# Patient Record
Sex: MALE | Race: BLACK OR AFRICAN AMERICAN | NOT HISPANIC OR LATINO | Employment: OTHER | URBAN - METROPOLITAN AREA
[De-identification: names, ages, dates, MRNs, and addresses within clinical notes are randomized per-mention and may not be internally consistent; named-entity substitution may affect disease eponyms.]

---

## 2017-09-01 ENCOUNTER — HOSPITAL ENCOUNTER (INPATIENT)
Facility: HOSPITAL | Age: 67
LOS: 4 days | Discharge: HOME | DRG: 291 | End: 2017-09-05
Attending: INTERNAL MEDICINE | Admitting: INTERNAL MEDICINE
Payer: MEDICARE

## 2017-09-01 DIAGNOSIS — J18.9 PNEUMONIA DUE TO INFECTIOUS ORGANISM, UNSPECIFIED LATERALITY, UNSPECIFIED PART OF LUNG: ICD-10-CM

## 2017-09-01 DIAGNOSIS — R07.2 PRECORDIAL PAIN: ICD-10-CM

## 2017-09-01 DIAGNOSIS — I21.A1 NON-ST ELEVATION MYOCARDIAL INFARCTION (NSTEMI), TYPE 2: ICD-10-CM

## 2017-09-01 DIAGNOSIS — I50.43 ACUTE ON CHRONIC COMBINED SYSTOLIC AND DIASTOLIC CONGESTIVE HEART FAILURE: ICD-10-CM

## 2017-09-01 DIAGNOSIS — I50.9 CHF (CONGESTIVE HEART FAILURE): Primary | ICD-10-CM

## 2017-09-01 PROCEDURE — 93306 TTE W/DOPPLER COMPLETE: CPT | Mod: 26,,, | Performed by: INTERNAL MEDICINE

## 2017-09-01 PROCEDURE — 93010 ELECTROCARDIOGRAM REPORT: CPT | Mod: ,,, | Performed by: INTERNAL MEDICINE

## 2017-09-01 PROCEDURE — C8929 TTE W OR WO FOL WCON,DOPPLER: HCPCS

## 2017-09-01 PROCEDURE — A4216 STERILE WATER/SALINE, 10 ML: HCPCS | Performed by: INTERNAL MEDICINE

## 2017-09-01 PROCEDURE — 11000001 HC ACUTE MED/SURG PRIVATE ROOM

## 2017-09-01 PROCEDURE — 93005 ELECTROCARDIOGRAM TRACING: CPT

## 2017-09-01 PROCEDURE — 25000003 PHARM REV CODE 250: Performed by: INTERNAL MEDICINE

## 2017-09-01 RX ORDER — LISINOPRIL 20 MG/1
40 TABLET ORAL DAILY
Status: DISCONTINUED | OUTPATIENT
Start: 2017-09-02 | End: 2017-09-05 | Stop reason: HOSPADM

## 2017-09-01 RX ORDER — POTASSIUM CHLORIDE 20 MEQ/1
20 TABLET, EXTENDED RELEASE ORAL 2 TIMES DAILY
Status: DISCONTINUED | OUTPATIENT
Start: 2017-09-01 | End: 2017-09-05 | Stop reason: HOSPADM

## 2017-09-01 RX ORDER — CARVEDILOL 25 MG/1
25 TABLET ORAL 2 TIMES DAILY WITH MEALS
Status: DISCONTINUED | OUTPATIENT
Start: 2017-09-01 | End: 2017-09-05 | Stop reason: HOSPADM

## 2017-09-01 RX ORDER — FUROSEMIDE 10 MG/ML
40 INJECTION INTRAMUSCULAR; INTRAVENOUS 2 TIMES DAILY
Status: DISCONTINUED | OUTPATIENT
Start: 2017-09-02 | End: 2017-09-03

## 2017-09-01 RX ORDER — ALBUTEROL SULFATE 90 UG/1
1 AEROSOL, METERED RESPIRATORY (INHALATION) EVERY 4 HOURS PRN
Status: DISCONTINUED | OUTPATIENT
Start: 2017-09-01 | End: 2017-09-05 | Stop reason: HOSPADM

## 2017-09-01 RX ORDER — ATORVASTATIN CALCIUM 40 MG/1
80 TABLET, FILM COATED ORAL DAILY
Status: DISCONTINUED | OUTPATIENT
Start: 2017-09-02 | End: 2017-09-05 | Stop reason: HOSPADM

## 2017-09-01 RX ORDER — BENZONATATE 100 MG/1
100 CAPSULE ORAL EVERY 4 HOURS PRN
Status: DISCONTINUED | OUTPATIENT
Start: 2017-09-01 | End: 2017-09-05 | Stop reason: HOSPADM

## 2017-09-01 RX ORDER — ASPIRIN 81 MG/1
81 TABLET ORAL DAILY
Status: DISCONTINUED | OUTPATIENT
Start: 2017-09-02 | End: 2017-09-05 | Stop reason: HOSPADM

## 2017-09-01 RX ORDER — ONDANSETRON 8 MG/1
8 TABLET, ORALLY DISINTEGRATING ORAL EVERY 8 HOURS PRN
Status: DISCONTINUED | OUTPATIENT
Start: 2017-09-01 | End: 2017-09-05 | Stop reason: HOSPADM

## 2017-09-01 RX ORDER — SODIUM CHLORIDE 0.9 % (FLUSH) 0.9 %
3 SYRINGE (ML) INJECTION EVERY 8 HOURS
Status: DISCONTINUED | OUTPATIENT
Start: 2017-09-01 | End: 2017-09-05 | Stop reason: HOSPADM

## 2017-09-01 RX ORDER — FUROSEMIDE 40 MG/1
40 TABLET ORAL 2 TIMES DAILY
Status: DISCONTINUED | OUTPATIENT
Start: 2017-09-01 | End: 2017-09-02

## 2017-09-01 RX ORDER — MIRTAZAPINE 15 MG/1
15 TABLET, FILM COATED ORAL NIGHTLY
Status: DISCONTINUED | OUTPATIENT
Start: 2017-09-01 | End: 2017-09-05 | Stop reason: HOSPADM

## 2017-09-01 RX ORDER — FUROSEMIDE 10 MG/ML
40 INJECTION INTRAMUSCULAR; INTRAVENOUS 2 TIMES DAILY
Status: DISCONTINUED | OUTPATIENT
Start: 2017-09-02 | End: 2017-09-01

## 2017-09-01 RX ADMIN — BENZONATATE 100 MG: 100 CAPSULE ORAL at 09:09

## 2017-09-01 RX ADMIN — MIRTAZAPINE 15 MG: 15 TABLET, FILM COATED ORAL at 09:09

## 2017-09-01 RX ADMIN — POTASSIUM CHLORIDE 20 MEQ: 20 TABLET, EXTENDED RELEASE ORAL at 09:09

## 2017-09-01 RX ADMIN — SODIUM CHLORIDE, PRESERVATIVE FREE 3 ML: 5 INJECTION INTRAVENOUS at 09:09

## 2017-09-01 RX ADMIN — FUROSEMIDE 40 MG: 40 TABLET ORAL at 09:09

## 2017-09-01 RX ADMIN — CARVEDILOL 25 MG: 25 TABLET, FILM COATED ORAL at 09:09

## 2017-09-02 LAB
ALBUMIN SERPL BCP-MCNC: 3.8 G/DL
ALP SERPL-CCNC: 38 U/L
ALT SERPL W/O P-5'-P-CCNC: 18 U/L
ANION GAP SERPL CALC-SCNC: 8 MMOL/L
ANION GAP SERPL CALC-SCNC: 9 MMOL/L
AST SERPL-CCNC: 37 U/L
BASOPHILS # BLD AUTO: 0.05 K/UL
BASOPHILS NFR BLD: 1 %
BILIRUB SERPL-MCNC: 0.6 MG/DL
BUN SERPL-MCNC: 23 MG/DL
BUN SERPL-MCNC: 24 MG/DL
CALCIUM SERPL-MCNC: 9.3 MG/DL
CALCIUM SERPL-MCNC: 9.6 MG/DL
CHLORIDE SERPL-SCNC: 101 MMOL/L
CHLORIDE SERPL-SCNC: 103 MMOL/L
CO2 SERPL-SCNC: 28 MMOL/L
CO2 SERPL-SCNC: 32 MMOL/L
CREAT SERPL-MCNC: 1.2 MG/DL
CREAT SERPL-MCNC: 1.2 MG/DL
DIFFERENTIAL METHOD: ABNORMAL
EOSINOPHIL # BLD AUTO: 0.4 K/UL
EOSINOPHIL NFR BLD: 7.9 %
ERYTHROCYTE [DISTWIDTH] IN BLOOD BY AUTOMATED COUNT: 13 %
EST. GFR  (AFRICAN AMERICAN): >60 ML/MIN/1.73 M^2
EST. GFR  (AFRICAN AMERICAN): >60 ML/MIN/1.73 M^2
EST. GFR  (NON AFRICAN AMERICAN): >60 ML/MIN/1.73 M^2
EST. GFR  (NON AFRICAN AMERICAN): >60 ML/MIN/1.73 M^2
GLUCOSE SERPL-MCNC: 97 MG/DL
GLUCOSE SERPL-MCNC: 98 MG/DL
HCT VFR BLD AUTO: 41.7 %
HGB BLD-MCNC: 13.6 G/DL
LYMPHOCYTES # BLD AUTO: 2 K/UL
LYMPHOCYTES NFR BLD: 41.1 %
MAGNESIUM SERPL-MCNC: 1.9 MG/DL
MCH RBC QN AUTO: 29.4 PG
MCHC RBC AUTO-ENTMCNC: 32.6 G/DL
MCV RBC AUTO: 90 FL
MONOCYTES # BLD AUTO: 0.6 K/UL
MONOCYTES NFR BLD: 13.4 %
NEUTROPHILS # BLD AUTO: 1.7 K/UL
NEUTROPHILS NFR BLD: 36.4 %
PHOSPHATE SERPL-MCNC: 5.2 MG/DL
PLATELET # BLD AUTO: 123 K/UL
PMV BLD AUTO: 10.5 FL
POTASSIUM SERPL-SCNC: 4.4 MMOL/L
POTASSIUM SERPL-SCNC: 5.3 MMOL/L
PROT SERPL-MCNC: 7.6 G/DL
RBC # BLD AUTO: 4.62 M/UL
SODIUM SERPL-SCNC: 139 MMOL/L
SODIUM SERPL-SCNC: 142 MMOL/L
TROPONIN I SERPL DL<=0.01 NG/ML-MCNC: 0.06 NG/ML
WBC # BLD AUTO: 4.79 K/UL

## 2017-09-02 PROCEDURE — 85025 COMPLETE CBC W/AUTO DIFF WBC: CPT

## 2017-09-02 PROCEDURE — 94761 N-INVAS EAR/PLS OXIMETRY MLT: CPT

## 2017-09-02 PROCEDURE — 80048 BASIC METABOLIC PNL TOTAL CA: CPT

## 2017-09-02 PROCEDURE — 84100 ASSAY OF PHOSPHORUS: CPT

## 2017-09-02 PROCEDURE — 27000221 HC OXYGEN, UP TO 24 HOURS

## 2017-09-02 PROCEDURE — 25000242 PHARM REV CODE 250 ALT 637 W/ HCPCS: Performed by: INTERNAL MEDICINE

## 2017-09-02 PROCEDURE — 84484 ASSAY OF TROPONIN QUANT: CPT

## 2017-09-02 PROCEDURE — 36415 COLL VENOUS BLD VENIPUNCTURE: CPT

## 2017-09-02 PROCEDURE — 80053 COMPREHEN METABOLIC PANEL: CPT

## 2017-09-02 PROCEDURE — 63600175 PHARM REV CODE 636 W HCPCS: Performed by: INTERNAL MEDICINE

## 2017-09-02 PROCEDURE — 83735 ASSAY OF MAGNESIUM: CPT

## 2017-09-02 PROCEDURE — 25000003 PHARM REV CODE 250: Performed by: INTERNAL MEDICINE

## 2017-09-02 PROCEDURE — 11000001 HC ACUTE MED/SURG PRIVATE ROOM

## 2017-09-02 PROCEDURE — 99223 1ST HOSP IP/OBS HIGH 75: CPT | Mod: ,,, | Performed by: INTERNAL MEDICINE

## 2017-09-02 PROCEDURE — A4216 STERILE WATER/SALINE, 10 ML: HCPCS | Performed by: INTERNAL MEDICINE

## 2017-09-02 PROCEDURE — 27100098 HC SPACER

## 2017-09-02 PROCEDURE — 94640 AIRWAY INHALATION TREATMENT: CPT

## 2017-09-02 RX ORDER — ACETAMINOPHEN 325 MG/1
650 TABLET ORAL EVERY 6 HOURS PRN
Status: DISCONTINUED | OUTPATIENT
Start: 2017-09-02 | End: 2017-09-05 | Stop reason: HOSPADM

## 2017-09-02 RX ADMIN — ATORVASTATIN CALCIUM 80 MG: 40 TABLET, FILM COATED ORAL at 08:09

## 2017-09-02 RX ADMIN — FUROSEMIDE 40 MG: 40 TABLET ORAL at 08:09

## 2017-09-02 RX ADMIN — POTASSIUM CHLORIDE 20 MEQ: 20 TABLET, EXTENDED RELEASE ORAL at 09:09

## 2017-09-02 RX ADMIN — SODIUM CHLORIDE, PRESERVATIVE FREE 3 ML: 5 INJECTION INTRAVENOUS at 05:09

## 2017-09-02 RX ADMIN — SODIUM CHLORIDE, PRESERVATIVE FREE 3 ML: 5 INJECTION INTRAVENOUS at 01:09

## 2017-09-02 RX ADMIN — POTASSIUM CHLORIDE 20 MEQ: 20 TABLET, EXTENDED RELEASE ORAL at 08:09

## 2017-09-02 RX ADMIN — MIRTAZAPINE 15 MG: 15 TABLET, FILM COATED ORAL at 09:09

## 2017-09-02 RX ADMIN — LISINOPRIL 40 MG: 20 TABLET ORAL at 08:09

## 2017-09-02 RX ADMIN — CARVEDILOL 25 MG: 25 TABLET, FILM COATED ORAL at 05:09

## 2017-09-02 RX ADMIN — CARVEDILOL 25 MG: 25 TABLET, FILM COATED ORAL at 08:09

## 2017-09-02 RX ADMIN — ASPIRIN 81 MG: 81 TABLET, COATED ORAL at 08:09

## 2017-09-02 RX ADMIN — SODIUM CHLORIDE, PRESERVATIVE FREE 3 ML: 5 INJECTION INTRAVENOUS at 09:09

## 2017-09-02 RX ADMIN — ALBUTEROL SULFATE 1 PUFF: 90 AEROSOL, METERED RESPIRATORY (INHALATION) at 07:09

## 2017-09-02 RX ADMIN — FUROSEMIDE 40 MG: 10 INJECTION, SOLUTION INTRAMUSCULAR; INTRAVENOUS at 05:09

## 2017-09-02 RX ADMIN — FUROSEMIDE 40 MG: 10 INJECTION, SOLUTION INTRAMUSCULAR; INTRAVENOUS at 08:09

## 2017-09-02 RX ADMIN — BENZONATATE 100 MG: 100 CAPSULE ORAL at 09:09

## 2017-09-02 NOTE — PLAN OF CARE
Problem: Patient Care Overview  Goal: Plan of Care Review  Outcome: Ongoing (interventions implemented as appropriate)  Plan of care reviewed with patient. Call light within reach, fall precautions maintained, bed in lowest position, wheels locked, bed alarm set, side rails up x's 2. Patient aware. Nurse instructed patient to call if needs assistance. Patient verbalized complete understanding. Telemetry monitor paced throughout shift. Pt remains on 2L NC. Pt denies any pain or discomfort. Will continue to monitor and continue plan of care.

## 2017-09-02 NOTE — H&P
[facility]               H&P      Reason for Admission:        Assessment:    1. Acute decompensated CHF  2. NSTEMI  3. Pacemaker  4. HTN  5. Chest Pain likely Muscle skeletal from coughing      Plan:    Will diurese with lasix 40 mg IV bid  Strict I/Os  2 d echo complete  Trend troponin. If continues to increase will consider [facility]  Pacemaker interrogation        Tim Live MD  Cardiology Service      HPI: 67 y/o AA male with PMH of HTN, CAD s/p 2 stents, Pacemaker, CHF present with progressively worsening dyspnea. Dyspnea is associated with coughing and chest pain but chest pain is intermittent and only occur with coughing. He was able to walk 1 block when moved here from [place] 2 weeks ago but is now unable to walk 25 feet. Coughing is non productive. Patient admit to eating salty food since moving. [facility] He denies non compliance with medications. Troponin is mildly elevated and CXR showed CHF.           Review of Systems -Except for what was mentioned above in HPI  Constitution: Negative.   HENT: Negative.   Eyes: Negative.   Cardiovascular: Negative.   Respiratory: Negative.   Endocrine: Negative.   Hematologic/Lymphatic: Negative.   Skin: Negative.   Musculoskeletal: Negative.   Gastrointestinal: Negative.           Past Surgical History:   Procedure Laterality Date    JOINT REPLACEMENT      R hip and R knee       Past Medical History:   Diagnosis Date    CHF (congestive heart failure)     Coronary artery disease     HLD (hyperlipidemia)     Insomnia     Mood disorder     PTSD (post-traumatic stress disorder)         reports that he has never smoked. He has never used smokeless tobacco. He reports that he does not drink alcohol or use drugs.     No family history on file.       Review of patient's allergies indicates:   Allergen Reactions    Pcn [penicillins]        Current Discharge Medication List      CONTINUE these medications which have NOT CHANGED     Details   albuterol sulfate 90 mcg/actuation AePB Inhale 90 mcg into the lungs every 6 (six) hours as needed. Rescue      aspirin (ECOTRIN) 81 MG EC tablet Take 81 mg by mouth once daily.      atorvastatin (LIPITOR) 80 MG tablet Take 80 mg by mouth once daily.      carvedilol (COREG) 25 MG tablet Take 25 mg by mouth 2 (two) times daily with meals.      furosemide (LASIX) 40 MG tablet Take 40 mg by mouth 2 (two) times daily.      lisinopril (PRINIVIL,ZESTRIL) 40 MG tablet Take 40 mg by mouth once daily.      magnesium oxide (MAG-OX) 250 mg Tab Take by mouth once.      mirtazapine (REMERON) 15 MG tablet Take 15 mg by mouth every evening.      nitroGLYCERIN (NITROSTAT) 0.4 MG SL tablet Place 0.4 mg under the tongue every 5 (five) minutes as needed for Chest pain.      potassium chloride SA (K-DUR,KLOR-CON) 20 MEQ tablet Take 20 mEq by mouth 2 (two) times daily.              aspirin  81 mg Oral Daily    atorvastatin  80 mg Oral Daily    carvedilol  25 mg Oral BID WM    furosemide  40 mg Intravenous BID    lisinopril  40 mg Oral Daily    mirtazapine  15 mg Oral QHS    potassium chloride SA  20 mEq Oral BID    sodium chloride 0.9%  3 mL Intravenous Q8H            albuterol, benzonatate, ondansetron    Vitals:    09/02/17 0431 09/02/17 0500 09/02/17 0745 09/02/17 0818   BP: 125/85   (!) 180/90   BP Location: Left arm   Right arm   Patient Position: Lying   Lying   Pulse: 86 62 76 77   Resp: 20  20 20   Temp: (!) 101.1 °F (38.4 °C)   99.3 °F (37.4 °C)   TempSrc: Oral   Oral   SpO2:   96%    Weight:       Height:             Physical Examination:  General Appearance: No acute distress  Mental: Alert to person, time and place  HEENT: Perrl  Chest: No pain with palpitations, no chest deformities  Heart: RRR, no murmurs, no gallops or rubs  Lung: Rales bilaterally   ABDOMEN: Soft, nontender, nondistended with good bowel sounds heard. No mass or hepatosplenomegaly  EXTREMITIES: Without cyanosis, clubbing or edema.    NEUROLOGICAL: Gross nonfocal. Skin: Warm and dry without any rash. There is no costovertebral angle tenderness.   Skin: no rashes lesions or ulcers    Lab Results   Component Value Date     09/02/2017    K 5.3 (H) 09/02/2017     09/02/2017    CO2 32 (H) 09/02/2017    BUN 23 09/02/2017    CREATININE 1.2 09/02/2017    GLU 97 09/02/2017    MG 1.9 09/02/2017    AST 37 09/02/2017    ALT 18 09/02/2017    ALBUMIN 3.8 09/02/2017    PROT 7.6 09/02/2017    BILITOT 0.6 09/02/2017    WBC 4.79 09/02/2017    HGB 13.6 (L) 09/02/2017    HCT 41.7 09/02/2017    MCV 90 09/02/2017     (L) 09/02/2017    INR 1.2 09/01/2017         Recent Labs  Lab 09/01/17  1258   TROPONINI 0.072*         Recent Labs  Lab 09/01/17  1258   TROPONINI 0.072*         No results found for: TSH    No results found for: HGBA1C            Images and labs reviewed    ECG: Ventricular paced rhythm with secondary ST changes

## 2017-09-02 NOTE — PLAN OF CARE
Plan of care reviewed with patient, understanding verbalized. No complaints of chest pain overnight except for soreness when coughing. PRN meds given for wheezing and cough. Pt resting comfortably, no acute distress noted. Bed alarm on, call light in reach, fall precautions in place. Will continue to monitor and report to oncoming nurse.

## 2017-09-02 NOTE — PLAN OF CARE
Problem: Patient Care Overview  Goal: Plan of Care Review  Outcome: Ongoing (interventions implemented as appropriate)  Pt received on NC at 2 LPM. Pt SpO2 95 %. No respiratory distress. Will continue to monitor.

## 2017-09-02 NOTE — PLAN OF CARE
09/02/17 1120   Discharge Assessment   Assessment Type Discharge Planning Assessment   Confirmed/corrected address and phone number on facesheet? Yes   Assessment information obtained from? Patient   Prior to hospitilization cognitive status: Alert/Oriented   Prior to hospitalization functional status: Independent;Assistive Equipment   Current cognitive status: Alert/Oriented   Current Functional Status: Independent;Assistive Equipment   Facility Arrived From: [facility]   Lives With sibling(s)   Able to Return to Prior Arrangements yes   Is patient able to care for self after discharge? Yes   Who are your caregiver(s) and their phone number(s)? May Shah(sister)[phone]   Readmission Within The Last 30 Days no previous admission in last 30 days   Patient currently being followed by outpatient case management? No   Patient currently receives any other outside agency services? No   Equipment Currently Used at Home cane, quad   Do you have any problems affording any of your prescribed medications? No   Is the patient taking medications as prescribed? yes   Does the patient have transportation home? Yes   Transportation Available family or friend will provide;car   Does the patient receive services at the [facility]? No   Discharge Plan A Home with family   Discharge Plan B Home Health   Patient/Family In Agreement With Plan yes        09/02/17 1120   Discharge Assessment   Assessment Type Discharge Planning Assessment   Confirmed/corrected address and phone number on facesheet? Yes   Assessment information obtained from? Patient   Prior to hospitilization cognitive status: Alert/Oriented   Prior to hospitalization functional status: Independent;Assistive Equipment   Current cognitive status: Alert/Oriented   Current Functional Status: Independent;Assistive Equipment   Facility Arrived From: [facility]   Lives With sibling(s)   Able to Return to Prior Arrangements yes   Is  patient able to care for self after discharge? Yes   Who are your caregiver(s) and their phone number(s)? May Shah(sister)[phone]   Readmission Within The Last 30 Days no previous admission in last 30 days   Patient currently being followed by outpatient case management? No   Patient currently receives any other outside agency services? No   Equipment Currently Used at Home cane, quad   Do you have any problems affording any of your prescribed medications? No   Is the patient taking medications as prescribed? yes   Does the patient have transportation home? Yes   Transportation Available family or friend will provide;car   Does the patient receive services at the [facility]? No   Discharge Plan A Home with family   Discharge Plan B Home Health   Patient/Family In Agreement With Plan yes   [facility] [place]. He has a new PCP is [facility] but can't remember her name. The pt states he drives hisself to his dr appointments but his niece brings him also. The pt states his [facility] doctor told him to go to [facility].

## 2017-09-02 NOTE — PLAN OF CARE
Problem: Patient Care Overview  Goal: Plan of Care Review  Outcome: Ongoing (interventions implemented as appropriate)  Pt received on NC at 2 LPM. Pt SpO2 96 %. No respiratory distress. Will continue to monitor.

## 2017-09-03 LAB
ANION GAP SERPL CALC-SCNC: 10 MMOL/L
BUN SERPL-MCNC: 28 MG/DL
CALCIUM SERPL-MCNC: 9 MG/DL
CHLORIDE SERPL-SCNC: 99 MMOL/L
CO2 SERPL-SCNC: 34 MMOL/L
CREAT SERPL-MCNC: 1.5 MG/DL
DIASTOLIC DYSFUNCTION: YES
EST. GFR  (AFRICAN AMERICAN): 55 ML/MIN/1.73 M^2
EST. GFR  (NON AFRICAN AMERICAN): 47 ML/MIN/1.73 M^2
GLUCOSE SERPL-MCNC: 96 MG/DL
MITRAL VALVE MOBILITY: NORMAL
POTASSIUM SERPL-SCNC: 3.7 MMOL/L
RETIRED EF AND QEF - SEE NOTES: 55 (ref 55–65)
SODIUM SERPL-SCNC: 143 MMOL/L

## 2017-09-03 PROCEDURE — 25000003 PHARM REV CODE 250: Performed by: INTERNAL MEDICINE

## 2017-09-03 PROCEDURE — 94640 AIRWAY INHALATION TREATMENT: CPT

## 2017-09-03 PROCEDURE — 27000221 HC OXYGEN, UP TO 24 HOURS

## 2017-09-03 PROCEDURE — 11000001 HC ACUTE MED/SURG PRIVATE ROOM

## 2017-09-03 PROCEDURE — 80048 BASIC METABOLIC PNL TOTAL CA: CPT

## 2017-09-03 PROCEDURE — 94761 N-INVAS EAR/PLS OXIMETRY MLT: CPT

## 2017-09-03 PROCEDURE — A4216 STERILE WATER/SALINE, 10 ML: HCPCS | Performed by: INTERNAL MEDICINE

## 2017-09-03 PROCEDURE — 87040 BLOOD CULTURE FOR BACTERIA: CPT

## 2017-09-03 PROCEDURE — 36415 COLL VENOUS BLD VENIPUNCTURE: CPT

## 2017-09-03 PROCEDURE — 63600175 PHARM REV CODE 636 W HCPCS: Performed by: INTERNAL MEDICINE

## 2017-09-03 PROCEDURE — 99233 SBSQ HOSP IP/OBS HIGH 50: CPT | Mod: ,,, | Performed by: INTERNAL MEDICINE

## 2017-09-03 PROCEDURE — 25000242 PHARM REV CODE 250 ALT 637 W/ HCPCS: Performed by: INTERNAL MEDICINE

## 2017-09-03 RX ORDER — FUROSEMIDE 40 MG/1
40 TABLET ORAL 2 TIMES DAILY
Status: DISCONTINUED | OUTPATIENT
Start: 2017-09-03 | End: 2017-09-05 | Stop reason: HOSPADM

## 2017-09-03 RX ORDER — MOXIFLOXACIN HYDROCHLORIDE 400 MG/1
400 TABLET ORAL DAILY
Status: DISCONTINUED | OUTPATIENT
Start: 2017-09-03 | End: 2017-09-05 | Stop reason: HOSPADM

## 2017-09-03 RX ADMIN — ACETAMINOPHEN 650 MG: 325 TABLET ORAL at 12:09

## 2017-09-03 RX ADMIN — LISINOPRIL 40 MG: 20 TABLET ORAL at 08:09

## 2017-09-03 RX ADMIN — SODIUM CHLORIDE, PRESERVATIVE FREE 3 ML: 5 INJECTION INTRAVENOUS at 06:09

## 2017-09-03 RX ADMIN — POTASSIUM CHLORIDE 20 MEQ: 20 TABLET, EXTENDED RELEASE ORAL at 09:09

## 2017-09-03 RX ADMIN — FUROSEMIDE 40 MG: 10 INJECTION, SOLUTION INTRAMUSCULAR; INTRAVENOUS at 08:09

## 2017-09-03 RX ADMIN — MOXIFLOXACIN HYDROCHLORIDE 400 MG: 400 TABLET, FILM COATED ORAL at 10:09

## 2017-09-03 RX ADMIN — BENZONATATE 100 MG: 100 CAPSULE ORAL at 09:09

## 2017-09-03 RX ADMIN — ALBUTEROL SULFATE 1 PUFF: 90 AEROSOL, METERED RESPIRATORY (INHALATION) at 10:09

## 2017-09-03 RX ADMIN — ACETAMINOPHEN 650 MG: 325 TABLET ORAL at 04:09

## 2017-09-03 RX ADMIN — SODIUM CHLORIDE, PRESERVATIVE FREE 3 ML: 5 INJECTION INTRAVENOUS at 09:09

## 2017-09-03 RX ADMIN — POTASSIUM CHLORIDE 20 MEQ: 20 TABLET, EXTENDED RELEASE ORAL at 08:09

## 2017-09-03 RX ADMIN — ASPIRIN 81 MG: 81 TABLET, COATED ORAL at 08:09

## 2017-09-03 RX ADMIN — ATORVASTATIN CALCIUM 80 MG: 40 TABLET, FILM COATED ORAL at 08:09

## 2017-09-03 RX ADMIN — FUROSEMIDE 40 MG: 40 TABLET ORAL at 05:09

## 2017-09-03 RX ADMIN — CARVEDILOL 25 MG: 25 TABLET, FILM COATED ORAL at 08:09

## 2017-09-03 RX ADMIN — MIRTAZAPINE 15 MG: 15 TABLET, FILM COATED ORAL at 09:09

## 2017-09-03 RX ADMIN — CARVEDILOL 25 MG: 25 TABLET, FILM COATED ORAL at 04:09

## 2017-09-03 RX ADMIN — SODIUM CHLORIDE, PRESERVATIVE FREE 3 ML: 5 INJECTION INTRAVENOUS at 01:09

## 2017-09-03 NOTE — PLAN OF CARE
Problem: Patient Care Overview  Goal: Plan of Care Review  Outcome: Ongoing (interventions implemented as appropriate)  Pt's SpO2 99% on RA. Prn MDI not required. No respiratory distress noted. Will continue to monitor SpO2.

## 2017-09-03 NOTE — PLAN OF CARE
Problem: Patient Care Overview  Goal: Plan of Care Review  Outcome: Ongoing (interventions implemented as appropriate)  Plan of care reviewed with patient. Patient verbalized understanding. Patient has slept for most of shift. PO antibiotic initiated this AM for PNA. Patient shows paced rhythm with right bundle branch block on telemetry monitoring, HR 60's, with no true red alarms noted. Patient continues to have frequent cough that is productive of white and yellow-tinged sputum. Bed is low and locked, bed alarm is activated, call light is within reach. Patient has been instructed to call if in need of assistance. Verbalized understanding.

## 2017-09-03 NOTE — PROGRESS NOTES
Progress Note            [facility]    Subjective: Patient's dyspnea is much improved. He continues to have chest pain with coughing. He had a fever last night. Vitals are stable.         Review of patient's allergies indicates:   Allergen Reactions    Pcn [penicillins]           aspirin  81 mg Oral Daily    atorvastatin  80 mg Oral Daily    carvedilol  25 mg Oral BID WM    furosemide  40 mg Oral BID    lisinopril  40 mg Oral Daily    mirtazapine  15 mg Oral QHS    moxifloxacin  400 mg Oral Daily    potassium chloride SA  20 mEq Oral BID    sodium chloride 0.9%  3 mL Intravenous Q8H            acetaminophen, albuterol, benzonatate, ondansetron    Vitals:    09/03/17 0458 09/03/17 0500 09/03/17 0737 09/03/17 0831   BP: 135/62   (!) 162/70   BP Location:    Right arm   Patient Position: Lying   Lying   Pulse: 62 68  63   Resp: 20   18   Temp: 99.5 °F (37.5 °C)   99.9 °F (37.7 °C)   TempSrc: Oral   Oral   SpO2:   99%    Weight:       Height:           Physical Examination:  General Appearance: No acute distress  Mental: Alert to person, time and place  HEENT: Perrl  Chest: No pain with palpitations, no chest deformities  Heart: RRR, no murmurs, no gallops or rubs  Lung: Coarse breath sounds  ABDOMEN: Soft, nontender, nondistended with good bowel sounds heard. No mass or hepatosplenomegaly  EXTREMITIES: Without cyanosis, clubbing or edema.   NEUROLOGICAL: Gross nonfocal. Skin: Warm and dry without any rash. There is no costovertebral angle tenderness.   Skin: no rashes lesions or ulcers    Lab Results   Component Value Date     09/03/2017    K 3.7 09/03/2017    CL 99 09/03/2017    CO2 34 (H) 09/03/2017    BUN 28 (H) 09/03/2017    CREATININE 1.5 (H) 09/03/2017    GLU 96 09/03/2017    MG 1.9 09/02/2017    AST 37 09/02/2017    ALT 18 09/02/2017    ALBUMIN 3.8 09/02/2017    PROT 7.6 09/02/2017    BILITOT 0.6 09/02/2017    WBC 4.79 09/02/2017    HGB 13.6 (L) 09/02/2017    HCT 41.7  09/02/2017    MCV 90 09/02/2017     (L) 09/02/2017    INR 1.2 09/01/2017       No results for input(s): CPK, CPKMB, TROPONINI, MB in the last 24 hours.      Recent Labs  Lab 09/02/17  0751   TROPONINI 0.063*         No results found for: TSH    No results found for: HGBA1C        Images and labs reviewed    Assessment:     1. Acute decompensated CHF- resolved  2. NSTEMI  3. Possible PNA  4. Pacemaker  5. HTN  6. Chest Pain likely Muscle skeletal from coughing and PNA        Plan:     Avelox 400 mg po daily  Strict I/Os  Pacemaker interrogation  Change lasix to 40 mg po bid  CXR     Tim Live MD  [facility]

## 2017-09-03 NOTE — PLAN OF CARE
Problem: Patient Care Overview  Goal: Plan of Care Review  Outcome: Ongoing (interventions implemented as appropriate)  Plan of care reviewed with patient, understanding verbalized. Pt remains on tele, paced in 60s and 70s.  Patient spiked fever overnight, cultures sent to lab. PRN meds given for cough, fever, and wheezing. Bed alarm on, call light in reach, fall precautions in place. Will continue to monitor.

## 2017-09-04 LAB
ANION GAP SERPL CALC-SCNC: 8 MMOL/L
BUN SERPL-MCNC: 34 MG/DL
CALCIUM SERPL-MCNC: 9.3 MG/DL
CHLORIDE SERPL-SCNC: 99 MMOL/L
CO2 SERPL-SCNC: 35 MMOL/L
CREAT SERPL-MCNC: 1.6 MG/DL
EST. GFR  (AFRICAN AMERICAN): 51 ML/MIN/1.73 M^2
EST. GFR  (NON AFRICAN AMERICAN): 44 ML/MIN/1.73 M^2
GLUCOSE SERPL-MCNC: 105 MG/DL
POTASSIUM SERPL-SCNC: 4.3 MMOL/L
SODIUM SERPL-SCNC: 142 MMOL/L

## 2017-09-04 PROCEDURE — 11000001 HC ACUTE MED/SURG PRIVATE ROOM

## 2017-09-04 PROCEDURE — 27000221 HC OXYGEN, UP TO 24 HOURS

## 2017-09-04 PROCEDURE — 99232 SBSQ HOSP IP/OBS MODERATE 35: CPT | Mod: ,,, | Performed by: INTERNAL MEDICINE

## 2017-09-04 PROCEDURE — 94761 N-INVAS EAR/PLS OXIMETRY MLT: CPT

## 2017-09-04 PROCEDURE — 25000003 PHARM REV CODE 250: Performed by: INTERNAL MEDICINE

## 2017-09-04 PROCEDURE — 80048 BASIC METABOLIC PNL TOTAL CA: CPT

## 2017-09-04 PROCEDURE — A4216 STERILE WATER/SALINE, 10 ML: HCPCS | Performed by: INTERNAL MEDICINE

## 2017-09-04 PROCEDURE — 36415 COLL VENOUS BLD VENIPUNCTURE: CPT

## 2017-09-04 RX ADMIN — CARVEDILOL 25 MG: 25 TABLET, FILM COATED ORAL at 08:09

## 2017-09-04 RX ADMIN — FUROSEMIDE 40 MG: 40 TABLET ORAL at 06:09

## 2017-09-04 RX ADMIN — POTASSIUM CHLORIDE 20 MEQ: 20 TABLET, EXTENDED RELEASE ORAL at 09:09

## 2017-09-04 RX ADMIN — ASPIRIN 81 MG: 81 TABLET, COATED ORAL at 08:09

## 2017-09-04 RX ADMIN — ATORVASTATIN CALCIUM 80 MG: 40 TABLET, FILM COATED ORAL at 08:09

## 2017-09-04 RX ADMIN — MIRTAZAPINE 15 MG: 15 TABLET, FILM COATED ORAL at 09:09

## 2017-09-04 RX ADMIN — MOXIFLOXACIN HYDROCHLORIDE 400 MG: 400 TABLET, FILM COATED ORAL at 08:09

## 2017-09-04 RX ADMIN — LISINOPRIL 40 MG: 20 TABLET ORAL at 08:09

## 2017-09-04 RX ADMIN — FUROSEMIDE 40 MG: 40 TABLET ORAL at 08:09

## 2017-09-04 RX ADMIN — SODIUM CHLORIDE, PRESERVATIVE FREE 3 ML: 5 INJECTION INTRAVENOUS at 05:09

## 2017-09-04 RX ADMIN — POTASSIUM CHLORIDE 20 MEQ: 20 TABLET, EXTENDED RELEASE ORAL at 08:09

## 2017-09-04 RX ADMIN — SODIUM CHLORIDE, PRESERVATIVE FREE 3 ML: 5 INJECTION INTRAVENOUS at 09:09

## 2017-09-04 RX ADMIN — BENZONATATE 100 MG: 100 CAPSULE ORAL at 08:09

## 2017-09-04 RX ADMIN — ACETAMINOPHEN 650 MG: 325 TABLET ORAL at 05:09

## 2017-09-04 RX ADMIN — SODIUM CHLORIDE, PRESERVATIVE FREE 3 ML: 5 INJECTION INTRAVENOUS at 03:09

## 2017-09-04 RX ADMIN — CARVEDILOL 25 MG: 25 TABLET, FILM COATED ORAL at 06:09

## 2017-09-04 NOTE — PLAN OF CARE
Problem: Patient Care Overview  Goal: Plan of Care Review  Outcome: Ongoing (interventions implemented as appropriate)  Continue to monitor patient's oxygenation status.

## 2017-09-04 NOTE — PROGRESS NOTES
Progress Note            [facility]    Subjective: Patient is doing better. He denies chest pain and dyspnea is better. He continues to have coughing with colored sputum. No further fever/chills.         Review of patient's allergies indicates:   Allergen Reactions    Pcn [penicillins]           aspirin  81 mg Oral Daily    atorvastatin  80 mg Oral Daily    carvedilol  25 mg Oral BID WM    furosemide  40 mg Oral BID    lisinopril  40 mg Oral Daily    mirtazapine  15 mg Oral QHS    moxifloxacin  400 mg Oral Daily    potassium chloride SA  20 mEq Oral BID    sodium chloride 0.9%  3 mL Intravenous Q8H            acetaminophen, albuterol, benzonatate, ondansetron    Vitals:    09/04/17 0500 09/04/17 0603 09/04/17 0733 09/04/17 0833   BP:  (!) 111/54 136/62    BP Location:       Patient Position:  Lying     Pulse: 66 66 62    Resp:  20 18    Temp:  (!) 101.2 °F (38.4 °C) 99.5 °F (37.5 °C)    TempSrc:  Oral     SpO2:    95%   Weight:       Height:           Physical Examination:  General Appearance: No acute distress  Mental: Alert to person, time and place  HEENT: Perrl  Chest: No pain with palpitations, no chest deformities  Heart: RRR, no murmurs, no gallops or rubs  Lung: CTAB  ABDOMEN: Soft, nontender, nondistended with good bowel sounds heard. No mass or hepatosplenomegaly  EXTREMITIES: Without cyanosis, clubbing or edema.   NEUROLOGICAL: Gross nonfocal. Skin: Warm and dry without any rash. There is no costovertebral angle tenderness.   Skin: no rashes lesions or ulcers    Lab Results   Component Value Date     09/04/2017    K 4.3 09/04/2017    CL 99 09/04/2017    CO2 35 (H) 09/04/2017    BUN 34 (H) 09/04/2017    CREATININE 1.6 (H) 09/04/2017     09/04/2017    MG 1.9 09/02/2017    AST 37 09/02/2017    ALT 18 09/02/2017    ALBUMIN 3.8 09/02/2017    PROT 7.6 09/02/2017    BILITOT 0.6 09/02/2017    WBC 4.79 09/02/2017    HGB 13.6 (L) 09/02/2017    HCT 41.7 09/02/2017     MCV 90 09/02/2017     (L) 09/02/2017    INR 1.2 09/01/2017       No results for input(s): CPK, CPKMB, TROPONINI, MB in the last 24 hours.      Recent Labs  Lab 09/02/17  0751   TROPONINI 0.063*         No results found for: TSH    No results found for: HGBA1C        Images and labs reviewed    Assessment:     1. Acute decompensated CHF- resolved  2. NSTEMI  3. Possible PNA  4. Pacemaker  5. HTN  6. Chest Pain likely Muscle skeletal from coughing and PNA        Plan:  Continue current therapy  Plan for discharge tomorrow    Tim Live MD  [facility]

## 2017-09-04 NOTE — PLAN OF CARE
Problem: Patient Care Overview  Goal: Plan of Care Review  Sats 100% 2L NC ,  Will continue to monitor.

## 2017-09-04 NOTE — PLAN OF CARE
Problem: Patient Care Overview  Goal: Plan of Care Review  Outcome: Ongoing (interventions implemented as appropriate)  Plan of care reviewed with patient. Patient verbalized understanding. Patient has remained afebrile throughout shift thus far. Patient has paced rhythm on telemetry monitoring, HR 60's, with R BBB. No true red alarms noted. Bed is low and locked, bed alarm is activated, call light is within reach. Patient has been instructed to call if in need of assistance. Verbalized understanding.

## 2017-09-04 NOTE — PLAN OF CARE
Plan of care reviewed with patient, understanding verbalized. Pt remains on tele, paced in 60s and 70s. PRN meds given for cough and fever. Bed alarm on, call light in reach, fall precautions in place. Will continue to monitor and report to oncoming nurse.

## 2017-09-05 VITALS
OXYGEN SATURATION: 97 % | HEIGHT: 65 IN | HEART RATE: 63 BPM | RESPIRATION RATE: 20 BRPM | DIASTOLIC BLOOD PRESSURE: 53 MMHG | SYSTOLIC BLOOD PRESSURE: 113 MMHG | BODY MASS INDEX: 45.84 KG/M2 | TEMPERATURE: 98 F | WEIGHT: 275.13 LBS

## 2017-09-05 PROBLEM — I21.A1 NON-ST ELEVATION MYOCARDIAL INFARCTION (NSTEMI), TYPE 2: Status: ACTIVE | Noted: 2017-09-05

## 2017-09-05 PROBLEM — R07.2 PRECORDIAL PAIN: Status: ACTIVE | Noted: 2017-09-05

## 2017-09-05 PROBLEM — J18.9 PNEUMONIA DUE TO INFECTIOUS ORGANISM: Status: ACTIVE | Noted: 2017-09-05

## 2017-09-05 LAB
ANION GAP SERPL CALC-SCNC: 7 MMOL/L
BUN SERPL-MCNC: 37 MG/DL
CALCIUM SERPL-MCNC: 9.4 MG/DL
CHLORIDE SERPL-SCNC: 98 MMOL/L
CO2 SERPL-SCNC: 36 MMOL/L
CREAT SERPL-MCNC: 1.7 MG/DL
EST. GFR  (AFRICAN AMERICAN): 47 ML/MIN/1.73 M^2
EST. GFR  (NON AFRICAN AMERICAN): 41 ML/MIN/1.73 M^2
GLUCOSE SERPL-MCNC: 116 MG/DL
POTASSIUM SERPL-SCNC: 4.3 MMOL/L
SODIUM SERPL-SCNC: 141 MMOL/L

## 2017-09-05 PROCEDURE — 94761 N-INVAS EAR/PLS OXIMETRY MLT: CPT

## 2017-09-05 PROCEDURE — 36415 COLL VENOUS BLD VENIPUNCTURE: CPT

## 2017-09-05 PROCEDURE — 99239 HOSP IP/OBS DSCHRG MGMT >30: CPT | Mod: ,,, | Performed by: INTERNAL MEDICINE

## 2017-09-05 PROCEDURE — A4216 STERILE WATER/SALINE, 10 ML: HCPCS | Performed by: INTERNAL MEDICINE

## 2017-09-05 PROCEDURE — 25000003 PHARM REV CODE 250: Performed by: INTERNAL MEDICINE

## 2017-09-05 PROCEDURE — 80048 BASIC METABOLIC PNL TOTAL CA: CPT

## 2017-09-05 PROCEDURE — 27000221 HC OXYGEN, UP TO 24 HOURS

## 2017-09-05 RX ORDER — BENZONATATE 100 MG/1
100 CAPSULE ORAL EVERY 4 HOURS PRN
Qty: 20 CAPSULE | Refills: 0 | Status: SHIPPED | OUTPATIENT
Start: 2017-09-05 | End: 2017-09-15

## 2017-09-05 RX ORDER — MOXIFLOXACIN HYDROCHLORIDE 400 MG/1
400 TABLET ORAL DAILY
Qty: 3 TABLET | Refills: 0 | Status: SHIPPED | OUTPATIENT
Start: 2017-09-06

## 2017-09-05 RX ADMIN — CARVEDILOL 25 MG: 25 TABLET, FILM COATED ORAL at 08:09

## 2017-09-05 RX ADMIN — POTASSIUM CHLORIDE 20 MEQ: 20 TABLET, EXTENDED RELEASE ORAL at 08:09

## 2017-09-05 RX ADMIN — MOXIFLOXACIN HYDROCHLORIDE 400 MG: 400 TABLET, FILM COATED ORAL at 08:09

## 2017-09-05 RX ADMIN — SODIUM CHLORIDE, PRESERVATIVE FREE 3 ML: 5 INJECTION INTRAVENOUS at 05:09

## 2017-09-05 RX ADMIN — ATORVASTATIN CALCIUM 80 MG: 40 TABLET, FILM COATED ORAL at 08:09

## 2017-09-05 RX ADMIN — LISINOPRIL 40 MG: 20 TABLET ORAL at 08:09

## 2017-09-05 RX ADMIN — ASPIRIN 81 MG: 81 TABLET, COATED ORAL at 08:09

## 2017-09-05 RX ADMIN — FUROSEMIDE 40 MG: 40 TABLET ORAL at 08:09

## 2017-09-05 NOTE — PLAN OF CARE
Problem: Patient Care Overview  Goal: Plan of Care Review  Outcome: Ongoing (interventions implemented as appropriate)  Patient on oxygen with documented flow.  Will attempt to wean per O2 order protocol.  Sats 95%, Will continue to monitor.

## 2017-09-05 NOTE — HOSPITAL COURSE
Patient admitted with ADHF and PNA. Responded well to diuresis -4.7L from admission. Cough improving as well as sputum production with Avelox. T max 100.6 overnight. VSS.   Echo CONCLUSIONS     1 - Normal left ventricular systolic function (EF 55-60%).     2 - Concentric hypertrophy. moderate to severe LVH    3 - Impaired LV relaxation, increased LVEDP.     4 - Normal right ventricular systolic function   Patient found to be tolerating ADLs at baseline and appropriate for discharge. Counseled extensively on medical compliance and sodium restricted diet. Avelox to be continued upon discharge with completion date of 09/07/2017.

## 2017-09-05 NOTE — ASSESSMENT & PLAN NOTE
Presented with chest pain and increased WERNER in setting of ADHF and PNA, noncompliant with diet and medications  Mild trop elevated felt to be demand in setting of aforementioned  Continue asa, statin, BB, ACEI

## 2017-09-05 NOTE — PROGRESS NOTES
[facility]  Cardiology  Progress Note    Patient Name: Ariel Mcknight Jr.  MRN: [id number]  Admission Date: 9/1/2017  Hospital Length of Stay: 4 days  Code Status: Full Code   Attending Physician: Tim Live MD   Primary Care Physician: Tim Live MD  Expected Discharge Date:   Principal Problem:CHF (congestive heart failure)    Subjective:     Hospital Course:   Patient admitted with ADHF and PNA. Responded well to diuresis -4.7L from admission. Cough improving as well as sputum production with Avelox. T max 100.6 overnight. VSS.           Review of Systems   Constitution: Negative for diaphoresis.   HENT: Positive for congestion.    Eyes: Negative.    Cardiovascular: Positive for chest pain. Negative for dyspnea on exertion, leg swelling, near-syncope, orthopnea, palpitations and paroxysmal nocturnal dyspnea.   Respiratory: Positive for cough and sputum production. Negative for shortness of breath.    Endocrine: Negative.    Hematologic/Lymphatic: Negative.    Skin: Negative.    Musculoskeletal: Positive for myalgias.   Gastrointestinal: Negative.    Genitourinary: Negative.    Neurological: Negative.    Psychiatric/Behavioral: Negative.    Allergic/Immunologic: Negative.      Objective:     Vital Signs (Most Recent):  Temp: 99.2 °F (37.3 °C) (09/05/17 0718)  Pulse: 71 (09/05/17 0718)  Resp: 20 (09/05/17 0718)  BP: (!) 176/84 (09/05/17 0718)  SpO2: 95 % (09/05/17 0738) Vital Signs (24h Range):  Temp:  [97.6 °F (36.4 °C)-100.6 °F (38.1 °C)] 99.2 °F (37.3 °C)  Pulse:  [62-71] 71  Resp:  [18-24] 20  SpO2:  [95 %-97 %] 95 %  BP: (102-176)/(53-84) 176/84     Weight: 124.8 kg (275 lb 2.2 oz)  Body mass index is 45.78 kg/m².     SpO2: 95 %  O2 Device (Oxygen Therapy): nasal cannula      Intake/Output Summary (Last 24 hours) at 09/05/17 1000  Last data filed at 09/05/17 0500   Gross per 24 hour   Intake                0 ml   Output             1600 ml   Net            -1600 ml        Lines/Drains/Airways     Peripheral Intravenous Line                 Peripheral IV - Single Lumen 09/02/17 1015 Right Hand 2 days                Physical Exam   Constitutional: He is oriented to person, place, and time. He appears well-developed and well-nourished. No distress.   HENT:   Head: Normocephalic and atraumatic.   Eyes: Right eye exhibits no discharge. Left eye exhibits no discharge.   Neck: No JVD present.   Cardiovascular: Normal rate, regular rhythm and normal heart sounds.    Pulmonary/Chest: Effort normal and breath sounds normal. He has no wheezes. He has no rales. He exhibits tenderness.   Abdominal: Soft. Bowel sounds are normal.   Musculoskeletal: He exhibits no edema or tenderness.   Neurological: He is oriented to person, place, and time.   Skin: Skin is warm and dry. He is not diaphoretic.   Psychiatric: He has a normal mood and affect. His behavior is normal. Judgment and thought content normal.       Significant Labs:   Blood Culture: No results for input(s): LABBLOO in the last 48 hours., BMP:   Recent Labs  Lab 09/04/17 0443 09/05/17  0321    116*    141   K 4.3 4.3   CL 99 98   CO2 35* 36*   BUN 34* 37*   CREATININE 1.6* 1.7*   CALCIUM 9.3 9.4   , CMP   Recent Labs  Lab 09/04/17 0443 09/05/17  0321    141   K 4.3 4.3   CL 99 98   CO2 35* 36*    116*   BUN 34* 37*   CREATININE 1.6* 1.7*   CALCIUM 9.3 9.4   ANIONGAP 8 7*   ESTGFRAFRICA 51* 47*   EGFRNONAA 44* 41*   , CBC No results for input(s): WBC, HGB, HCT, PLT in the last 48 hours., INR No results for input(s): INR, PROTIME in the last 48 hours., Lipid Panel No results for input(s): CHOL, HDL, LDLCALC, TRIG, CHOLHDL in the last 48 hours., Troponin No results for input(s): TROPONINI in the last 48 hours. and All pertinent lab results from the last 24 hours have been reviewed.    Significant Imaging: Echocardiogram:   2D echo with color flow doppler:   Results for orders placed or performed during the  hospital encounter of 09/01/17   2D echo with color flow doppler   Result Value Ref Range    EF 55 55 - 65    Diastolic Dysfunction Yes (A)     Mitral Valve Mobility NORMAL      Assessment and Plan:     Brief HPI: Patient seen this morning on rounds without reports of cardiac chest pain. Productive cough continues, afebrile.     Pneumonia due to infectious organism    Tmax 100.6 overnight  Persistent productive cough with yellow sputum now improving   No consolidation noted on repeat CXR  Continue Avelox for a total of 5 days last dose 09/07/2017        Precordial pain    Felt to be musculoskeletal in nature 2/2 excessive coughing with PNA        * CHF (congestive heart failure)    EF 55% with DD and no WMA  Diuresed well with Lasix and is -4.7L from admission  Continue BB, ACEI, Lasix             VTE Risk Mitigation         Ordered     Medium Risk of VTE  Once      09/01/17 2016     Place sequential compression device  Until discontinued      09/01/17 2016          Esvin Watkins NP  Cardiology  [facility]

## 2017-09-05 NOTE — HPI
67 y/o AA male with PMH of HTN, CAD s/p 2 stents, Pacemaker, CHF present with progressively worsening dyspnea. Dyspnea is associated with coughing and chest pain but chest pain is intermittent and only occur with coughing. He was able to walk 1 block when moved here from [place] 2 weeks ago but is now unable to walk 25 feet. Coughing is non productive. Patient admit to eating salty food since moving. [facility] He denies non compliance with medications. Troponin is mildly elevated and CXR showed CHF.

## 2017-09-05 NOTE — PROGRESS NOTES
.Pharmacy New Medication Education    Patient accepted medication education.    Pharmacy educated patient on name and purpose of medications and possible side effects, using the teach-back method.     Apap  Albuterol  Asa  Lipitor  Tessalon  Coreg  Lasix  Lisinopril  Remeron  Avelox  Zofran  Nii      Learners of pharmacy medication education included:  patient    Patient +/- learner response:  teachback

## 2017-09-05 NOTE — PLAN OF CARE
TN went to meet with patient.   Patient discharged to home, no needs noted upon discharge.   No HH or HME ordered.   Follow-up appointment scheduled.    [facility], patient needs to call and schedule follow-up with his nurse. Patient is aware.    Future Appointments  Date Time Provider Department Center   9/27/2017 2:00 PM FRANK Davila, ANP [facility]     Follow-up With  Details  Why  Contact Info   FRANK Davila, SORAIDA  On 9/27/2017  at 2:00 pm--hospital follow-up.  [address] [place] [address]  [phone]   [facility]  Schedule an appointment as soon as possible for a visit  Please call and schedule appointment with your primary care physician.  [address] [facility]  [phone]  Open until 4:00 PM        09/05/17 1122   Final Note   Assessment Type Final Discharge Note   Discharge Disposition Home   What phone number can be called within the next 1-3 days to see how you are doing after discharge? [phone]   Hospital Follow Up  Appt(s) scheduled? Yes   Discharge plans and expectations educations in teach back method with documentation complete? Yes   Right Care Referral Info   Post Acute Recommendation No Care     Diane Burnham RN  Transition Navigator  [phone]

## 2017-09-05 NOTE — ASSESSMENT & PLAN NOTE
EF 55% with DD and no WMA  Diuresed well with Lasix and is -4.7L from admission  Continue BB, ACEI, Lasix

## 2017-09-05 NOTE — PLAN OF CARE
TN went to meet with patient.   Patient resting at this time.   No anticipated discharge needs.  TN will continue to follow.    Follow-up With  Details  Why  Contact Info   FRANK Davila, ANP  On 9/27/2017  at 2:00 pm--hospital follow-up.  [address] [address]  [phone]           09/05/17 1044   Discharge Reassessment   Assessment Type Discharge Planning Reassessment   Provided patient/caregiver education on the expected discharge date and the discharge plan No   Do you have any problems affording any of your prescribed medications? TBD   Discharge Plan A Home with family   Discharge Plan B Home with family;Home Health   Patient choice form signed by patient/caregiver N/A   Describe the patient's ability to walk at the present time. Walks with the help of equipment     Diane Burnham RN  Transition Navigator  [phone]

## 2017-09-05 NOTE — DISCHARGE INSTRUCTIONS
HEART FAILURE: MAKING CHANGES TO YOUR DIET (ENGLISH) View Edit Remove  HEART FAILURE: TAKING MEDICATION TO CONTROL (ENGLISH) View Edit Remove  HEART FAILURE, DISCHARGE INSTRUCTIONS FOR (ENGLISH) View Edit Remove  HEART DISEASE EDUCATION (ENGLISH) View Edit Remove

## 2017-09-05 NOTE — PLAN OF CARE
Problem: Patient Care Overview  Goal: Plan of Care Review  Outcome: Ongoing (interventions implemented as appropriate)  Patient lying in bed. Patient denies any SOB or chest pain. Call light within reach. Will continue to monitor.

## 2017-09-05 NOTE — SUBJECTIVE & OBJECTIVE
Review of Systems   Constitution: Negative for diaphoresis.   HENT: Positive for congestion.    Eyes: Negative.    Cardiovascular: Positive for chest pain. Negative for dyspnea on exertion, leg swelling, near-syncope, orthopnea, palpitations and paroxysmal nocturnal dyspnea.   Respiratory: Positive for cough and sputum production. Negative for shortness of breath.    Endocrine: Negative.    Hematologic/Lymphatic: Negative.    Skin: Negative.    Musculoskeletal: Positive for myalgias.   Gastrointestinal: Negative.    Genitourinary: Negative.    Neurological: Negative.    Psychiatric/Behavioral: Negative.    Allergic/Immunologic: Negative.      Objective:     Vital Signs (Most Recent):  Temp: 99.2 °F (37.3 °C) (09/05/17 0718)  Pulse: 71 (09/05/17 0718)  Resp: 20 (09/05/17 0718)  BP: (!) 176/84 (09/05/17 0718)  SpO2: 95 % (09/05/17 0738) Vital Signs (24h Range):  Temp:  [97.6 °F (36.4 °C)-100.6 °F (38.1 °C)] 99.2 °F (37.3 °C)  Pulse:  [62-71] 71  Resp:  [18-24] 20  SpO2:  [95 %-97 %] 95 %  BP: (102-176)/(53-84) 176/84     Weight: 124.8 kg (275 lb 2.2 oz)  Body mass index is 45.78 kg/m².     SpO2: 95 %  O2 Device (Oxygen Therapy): nasal cannula      Intake/Output Summary (Last 24 hours) at 09/05/17 1000  Last data filed at 09/05/17 0500   Gross per 24 hour   Intake                0 ml   Output             1600 ml   Net            -1600 ml       Lines/Drains/Airways     Peripheral Intravenous Line                 Peripheral IV - Single Lumen 09/02/17 1015 Right Hand 2 days                Physical Exam   Constitutional: He is oriented to person, place, and time. He appears well-developed and well-nourished. No distress.   HENT:   Head: Normocephalic and atraumatic.   Eyes: Right eye exhibits no discharge. Left eye exhibits no discharge.   Neck: No JVD present.   Cardiovascular: Normal rate, regular rhythm and normal heart sounds.    Pulmonary/Chest: Effort normal and breath sounds normal. He has no wheezes. He has no  rales. He exhibits tenderness.   Abdominal: Soft. Bowel sounds are normal.   Musculoskeletal: He exhibits no edema or tenderness.   Neurological: He is oriented to person, place, and time.   Skin: Skin is warm and dry. He is not diaphoretic.   Psychiatric: He has a normal mood and affect. His behavior is normal. Judgment and thought content normal.       Significant Labs:   Blood Culture: No results for input(s): LABBLOO in the last 48 hours., BMP:   Recent Labs  Lab 09/04/17 0443 09/05/17  0321    116*    141   K 4.3 4.3   CL 99 98   CO2 35* 36*   BUN 34* 37*   CREATININE 1.6* 1.7*   CALCIUM 9.3 9.4   , CMP   Recent Labs  Lab 09/04/17 0443 09/05/17  0321    141   K 4.3 4.3   CL 99 98   CO2 35* 36*    116*   BUN 34* 37*   CREATININE 1.6* 1.7*   CALCIUM 9.3 9.4   ANIONGAP 8 7*   ESTGFRAFRICA 51* 47*   EGFRNONAA 44* 41*   , CBC No results for input(s): WBC, HGB, HCT, PLT in the last 48 hours., INR No results for input(s): INR, PROTIME in the last 48 hours., Lipid Panel No results for input(s): CHOL, HDL, LDLCALC, TRIG, CHOLHDL in the last 48 hours., Troponin No results for input(s): TROPONINI in the last 48 hours. and All pertinent lab results from the last 24 hours have been reviewed.    Significant Imaging: Echocardiogram:   2D echo with color flow doppler:   Results for orders placed or performed during the hospital encounter of 09/01/17   2D echo with color flow doppler   Result Value Ref Range    EF 55 55 - 65    Diastolic Dysfunction Yes (A)     Mitral Valve Mobility NORMAL

## 2017-09-05 NOTE — ASSESSMENT & PLAN NOTE
Tmax 100.6 overnight  Persistent productive cough with yellow sputum now improving   No consolidation noted on repeat CXR  Continue Avelox for a total of 5 days last dose 09/07/2017

## 2017-09-05 NOTE — DISCHARGE SUMMARY
[facility]  Cardiology  Discharge Summary      Patient Name: Ariel Mcknight Jr.  MRN: [id number]  Admission Date: 9/1/2017  Hospital Length of Stay: 4 days  Discharge Date and Time:  09/05/2017 10:57 AM  Attending Physician: Tim Live MD    Discharging Provider: Esvin Watkins NP  Primary Care Physician: Tim Live MD    HPI:   65 y/o AA male with PMH of HTN, CAD s/p 2 stents, Pacemaker, CHF present with progressively worsening dyspnea. Dyspnea is associated with coughing and chest pain but chest pain is intermittent and only occur with coughing. He was able to walk 1 block when moved here from [place] 2 weeks ago but is now unable to walk 25 feet. Coughing is non productive. Patient admit to eating salty food since moving. [facility] He denies non compliance with medications. Troponin is mildly elevated and CXR showed CHF.     * No surgery found *     Indwelling Lines/Drains at time of discharge:  Lines/Drains/Airways          No matching active lines, drains, or airways          Hospital Course:  Patient admitted with ADHF and PNA. Responded well to diuresis -4.7L from admission. Cough improving as well as sputum production with Avelox. T max 100.6 overnight. VSS.   Echo CONCLUSIONS     1 - Normal left ventricular systolic function (EF 55-60%).     2 - Concentric hypertrophy. moderate to severe LVH    3 - Impaired LV relaxation, increased LVEDP.     4 - Normal right ventricular systolic function   Patient found to be tolerating ADLs at baseline and appropriate for discharge. Counseled extensively on medical compliance and sodium restricted diet. Avelox to be continued upon discharge with completion date of 09/07/2017.     Consults:     Significant Diagnostic Studies: Labs:   BMP:   Recent Labs  Lab 09/04/17  0443 09/05/17  0321    116*    141   K 4.3 4.3   CL 99 98   CO2 35* 36*   BUN 34* 37*   CREATININE 1.6* 1.7*   CALCIUM 9.3 9.4   , CMP    Recent Labs  Lab 09/04/17  0443 09/05/17  0321    141   K 4.3 4.3   CL 99 98   CO2 35* 36*    116*   BUN 34* 37*   CREATININE 1.6* 1.7*   CALCIUM 9.3 9.4   ANIONGAP 8 7*   ESTGFRAFRICA 51* 47*   EGFRNONAA 44* 41*   , CBC No results for input(s): WBC, HGB, HCT, PLT in the last 48 hours., INR   Lab Results   Component Value Date    INR 1.2 09/01/2017   , Lipid Panel No results found for: CHOL, HDL, LDLCALC, TRIG, CHOLHDL, Troponin   Recent Labs  Lab 09/02/17  0751   TROPONINI 0.063*   , A1C: No results for input(s): HGBA1C in the last 4320 hours. and All labs within the past 24 hours have been reviewed  Radiology: X-Ray: CXR: X-Ray Chest 1 View (CXR):   Results for orders placed or performed during the hospital encounter of 09/01/17   X-Ray Chest 1 View    Narrative    Portable AP view of the chest was obtained.  Comparison -- 9/1. Pacemaker is present on the left with electrodes to the right atrium and ventricle. The heart is mildly enlarged. Lungs are expanded and clear. No lung consolidation or pleural fluid is identified.    Impression     No acute change.      Electronically signed by: KAREN GEORGE MD  Date:     09/03/17  Time:    11:14      Cardiac Graphics: Echocardiogram:   2D echo with color flow doppler:   Results for orders placed or performed during the hospital encounter of 09/01/17   2D echo with color flow doppler   Result Value Ref Range    EF 55 55 - 65    Diastolic Dysfunction Yes (A)     Mitral Valve Mobility NORMAL        Pending Diagnostic Studies:     None          Final Active Diagnoses:    Diagnosis Date Noted POA    PRINCIPAL PROBLEM:  CHF (congestive heart failure) [I50.9] 09/01/2017 Yes    Precordial pain [R07.2] 09/05/2017 Unknown    Pneumonia due to infectious organism [J18.9] 09/05/2017 Unknown    Non-ST elevation myocardial infarction (NSTEMI), type 2 [I21.4] 09/05/2017 Unknown      Problems Resolved During this Admission:    Diagnosis Date Noted Date Resolved  POA     Non-ST elevation myocardial infarction (NSTEMI), type 2    Presented with chest pain and increased WERNER in setting of ADHF and PNA, noncompliant with diet and medications  Mild trop elevated felt to be demand in setting of aforementioned  Continue asa, statin, BB, ACEI         Pneumonia due to infectious organism    Tmax 100.6 overnight  Persistent productive cough with yellow sputum now improving   No consolidation noted on repeat CXR  Continue Avelox for a total of 5 days last dose 09/07/2017        Precordial pain    Felt to be musculoskeletal in nature 2/2 excessive coughing with PNA        * CHF (congestive heart failure)    EF 55% with DD and no WMA  Diuresed well with Lasix and is -4.7L from admission  Continue BB, ACEI, Lasix             Discharged Condition: good    Disposition: Home or Self Care    Follow Up:  Follow-up Information     Tim Live MD.    Specialty:  Cardiology  Why:  for hospital follow-up.  Contact information:  [address]  [address]  [facility] [id number] [phone]                 Patient Instructions:     Diet general   Order Specific Question Answer Comments   Additional restrictions: Cardiac (Low Na/Chol)      Activity as tolerated     Call MD for:  temperature >100.4     Call MD for:  severe uncontrolled pain     Call MD for:  difficulty breathing or increased cough     Call MD for:  persistent dizziness, light-headedness, or visual disturbances       Medications:  Reconciled Home Medications:   Current Discharge Medication List      START taking these medications    Details   benzonatate (TESSALON) 100 MG capsule Take 1 capsule (100 mg total) by mouth every 4 (four) hours as needed for Cough.  Qty: 20 capsule, Refills: 0      moxifloxacin (AVELOX) 400 mg tablet Take 1 tablet (400 mg total) by mouth once daily.  Qty: 3 tablet, Refills: 0         CONTINUE these medications which have NOT CHANGED    Details   albuterol sulfate 90 mcg/actuation AePB Inhale 90 mcg into  the lungs every 6 (six) hours as needed. Rescue      aspirin (ECOTRIN) 81 MG EC tablet Take 81 mg by mouth once daily.      atorvastatin (LIPITOR) 80 MG tablet Take 80 mg by mouth once daily.      carvedilol (COREG) 25 MG tablet Take 25 mg by mouth 2 (two) times daily with meals.      furosemide (LASIX) 40 MG tablet Take 40 mg by mouth 2 (two) times daily.      lisinopril (PRINIVIL,ZESTRIL) 40 MG tablet Take 40 mg by mouth once daily.      magnesium oxide (MAG-OX) 250 mg Tab Take by mouth once.      mirtazapine (REMERON) 15 MG tablet Take 15 mg by mouth every evening.      nitroGLYCERIN (NITROSTAT) 0.4 MG SL tablet Place 0.4 mg under the tongue every 5 (five) minutes as needed for Chest pain.      potassium chloride SA (K-DUR,KLOR-CON) 20 MEQ tablet Take 20 mEq by mouth 2 (two) times daily.             Time spent on the discharge of patient: 45 minutes    Esvin Watkins NP  Cardiology  [facility]

## 2017-09-06 NOTE — PHYSICIAN QUERY
"PT Name: Ariel Mcknight Jr.  MR #: [id number]    Physician Query Form - Heart  Condition Clarification     CDS/: Mikki Dominguez RN, CCDS        Contact information: [email or web address]  This form is a permanent document in the medical record.     Query Date: September 6, 2017    By submitting this query, we are merely seeking further clarification of documentation. Please utilize your independent clinical judgment when addressing the question(s) below.    The medical record contains the following   Indicators     Supporting Clinical Findings Location in Medical Record   X BNP 2430 9/1 lab   X EF Ef 55% with diastolic dysfunction 9/1 echo   X Radiology findings CXR showed CHF.  9/2 h/p    Echo Results      "Ascites" documented     X "SOB" or "WERNER" documented  Presented with chest pain and increased WERNER in setting of ADHF       9/5 d/c summary    "Hypoxia" documented     X Heart Failure documented Acute decompensated CHF  CHF 9/2 h/p  9/5 d/c summary    "Edema" documented     X Diuretics/Meds Furosemide 40mg IV 2 x a day   Diuresed well with Lasix and is -4.7L from admission  Continue BB, ACEI, Lasix  9/2, 9/3  Mar  9/5 d/c summary    Treatment:      Other:          Provider, please specify diagnosis or diagnoses associated with above clinical findings.                                 [  ] Acute Diastolic Heart Failure ( EF > 40)*  [ x ] Acute on Chronic Diastolic Heart Failure( EF > 40)*  [  ] Other Type of Heart Failure (please specify type): _________________________  [  ] Other (please specify): ___________________________________  [  ] Clinically Undetermined            *American [employer] Association                                                                                                          Please document in your progress notes daily for the duration of treatment until resolved and include in your discharge summary.    "

## 2017-09-08 LAB — BACTERIA BLD CULT: NORMAL
